# Patient Record
Sex: FEMALE
[De-identification: names, ages, dates, MRNs, and addresses within clinical notes are randomized per-mention and may not be internally consistent; named-entity substitution may affect disease eponyms.]

---

## 2017-04-19 ENCOUNTER — CHARTING TRANS (OUTPATIENT)
Dept: OTHER | Age: 32
End: 2017-04-19

## 2017-09-18 ENCOUNTER — CHARTING TRANS (OUTPATIENT)
Dept: OTHER | Age: 32
End: 2017-09-18

## 2017-09-21 ENCOUNTER — CHARTING TRANS (OUTPATIENT)
Dept: OTHER | Age: 32
End: 2017-09-21

## 2017-12-28 ENCOUNTER — HOSPITAL (OUTPATIENT)
Dept: OTHER | Age: 32
End: 2017-12-28
Attending: FAMILY MEDICINE

## 2018-11-03 VITALS
HEART RATE: 88 BPM | BODY MASS INDEX: 21.69 KG/M2 | TEMPERATURE: 98.6 F | RESPIRATION RATE: 18 BRPM | WEIGHT: 135 LBS | OXYGEN SATURATION: 98 % | HEIGHT: 66 IN

## 2022-06-23 ENCOUNTER — HOSPITAL ENCOUNTER (OUTPATIENT)
Age: 37
Discharge: HOME | End: 2022-06-23
Payer: COMMERCIAL

## 2022-06-23 VITALS
OXYGEN SATURATION: 99 % | RESPIRATION RATE: 20 BRPM | WEIGHT: 135 LBS | DIASTOLIC BLOOD PRESSURE: 87 MMHG | HEIGHT: 65 IN | SYSTOLIC BLOOD PRESSURE: 120 MMHG | TEMPERATURE: 98 F | BODY MASS INDEX: 22.49 KG/M2 | HEART RATE: 99 BPM

## 2022-06-23 DIAGNOSIS — H66.006 RECURRENT ACUTE SUPPURATIVE OTITIS MEDIA WITHOUT SPONTANEOUS RUPTURE OF TYMPANIC MEMBRANE OF BOTH SIDES: ICD-10-CM

## 2022-06-23 DIAGNOSIS — J01.41 ACUTE RECURRENT PANSINUSITIS: Primary | ICD-10-CM

## 2022-06-23 RX ORDER — PREDNISONE 20 MG/1
40 TABLET ORAL DAILY
Qty: 8 TABLET | Refills: 0 | Status: SHIPPED | OUTPATIENT
Start: 2022-06-23 | End: 2022-06-27

## 2022-06-23 RX ORDER — AMOXICILLIN AND CLAVULANATE POTASSIUM 875; 125 MG/1; MG/1
1 TABLET, FILM COATED ORAL 2 TIMES DAILY
Qty: 20 TABLET | Refills: 0 | Status: SHIPPED | OUTPATIENT
Start: 2022-06-23 | End: 2022-07-03

## 2022-06-23 RX ORDER — DEXAMETHASONE 4 MG/1
16 TABLET ORAL ONCE
Status: COMPLETED | OUTPATIENT
Start: 2022-06-23 | End: 2022-06-23

## 2022-09-22 ENCOUNTER — HOSPITAL ENCOUNTER (OUTPATIENT)
Age: 37
Discharge: HOME | End: 2022-09-22

## 2022-09-22 VITALS
SYSTOLIC BLOOD PRESSURE: 119 MMHG | RESPIRATION RATE: 20 BRPM | OXYGEN SATURATION: 100 % | HEART RATE: 104 BPM | DIASTOLIC BLOOD PRESSURE: 87 MMHG | TEMPERATURE: 98 F

## 2022-09-22 DIAGNOSIS — B97.89 VIRAL SINUSITIS: Primary | ICD-10-CM

## 2022-09-22 DIAGNOSIS — J32.9 VIRAL SINUSITIS: Primary | ICD-10-CM

## 2022-09-22 LAB — SARS-COV-2 RNA RESP QL NAA+PROBE: NOT DETECTED

## 2022-09-22 PROCEDURE — 99213 OFFICE O/P EST LOW 20 MIN: CPT | Performed by: NURSE PRACTITIONER

## 2022-09-22 PROCEDURE — U0002 COVID-19 LAB TEST NON-CDC: HCPCS | Performed by: NURSE PRACTITIONER

## 2022-09-22 RX ORDER — PREDNISONE 20 MG/1
20 TABLET ORAL 2 TIMES DAILY
Qty: 10 TABLET | Refills: 0 | Status: SHIPPED | OUTPATIENT
Start: 2022-09-22 | End: 2022-09-27

## 2022-09-22 RX ORDER — MOMETASONE FUROATE 50 UG/1
1 SPRAY, METERED NASAL DAILY
Qty: 1 EACH | Refills: 0 | Status: SHIPPED | OUTPATIENT
Start: 2022-09-22 | End: 2022-09-29

## 2022-09-22 NOTE — ED INITIAL ASSESSMENT (HPI)
Pt began 3-4 days ago with a sore throat and congestion, now it is in her sinuses and she has face and ear pressure

## 2022-09-29 ENCOUNTER — HOSPITAL ENCOUNTER (OUTPATIENT)
Age: 37
Discharge: HOME | End: 2022-09-29

## 2022-09-29 VITALS
TEMPERATURE: 98 F | RESPIRATION RATE: 18 BRPM | HEART RATE: 89 BPM | DIASTOLIC BLOOD PRESSURE: 90 MMHG | OXYGEN SATURATION: 97 % | SYSTOLIC BLOOD PRESSURE: 125 MMHG

## 2022-09-29 DIAGNOSIS — S61.213A LACERATION OF LEFT MIDDLE FINGER WITHOUT FOREIGN BODY WITHOUT DAMAGE TO NAIL, INITIAL ENCOUNTER: Primary | ICD-10-CM

## 2022-09-29 PROCEDURE — 12001 RPR S/N/AX/GEN/TRNK 2.5CM/<: CPT | Performed by: NURSE PRACTITIONER

## 2022-09-29 NOTE — ED INITIAL ASSESSMENT (HPI)
PT c/o finger lac to middle finger on left hand. Pt concerned it may need stitches.    In addition, Pt c/o continuous congestion since previous visit to IC 1x week ago

## 2024-06-22 ENCOUNTER — V-VISIT (OUTPATIENT)
Dept: URGENT CARE | Age: 39
End: 2024-06-22

## 2024-06-22 VITALS
TEMPERATURE: 98.9 F | BODY MASS INDEX: 24.99 KG/M2 | HEART RATE: 79 BPM | DIASTOLIC BLOOD PRESSURE: 90 MMHG | HEIGHT: 65 IN | SYSTOLIC BLOOD PRESSURE: 124 MMHG | WEIGHT: 150 LBS | OXYGEN SATURATION: 98 % | RESPIRATION RATE: 14 BRPM

## 2024-06-22 DIAGNOSIS — L30.9 DERMATITIS: ICD-10-CM
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
## 2024-06-22 DIAGNOSIS — S10.86XA INSECT BITE OF OTHER PART OF NECK, INITIAL ENCOUNTER: Primary | ICD-10-CM

## 2024-06-22 RX ORDER — CEPHALEXIN 500 MG/1
500 CAPSULE ORAL 2 TIMES DAILY
Qty: 14 CAPSULE | Refills: 0 | Status: SHIPPED | OUTPATIENT
Start: 2024-06-22 | End: 2024-06-29

## 2024-06-22 ASSESSMENT — ENCOUNTER SYMPTOMS
FEVER: 0
CHILLS: 0
HEADACHES: 0

## 2024-06-22 ASSESSMENT — PAIN SCALES - GENERAL: PAINLEVEL: 0

## 2024-06-25 ENCOUNTER — TELEPHONE (OUTPATIENT)
Dept: FAMILY MEDICINE | Age: 39
End: 2024-06-25

## 2024-09-10 ENCOUNTER — HOSPITAL ENCOUNTER (OUTPATIENT)
Age: 39
Discharge: HOME | End: 2024-09-10
Payer: COMMERCIAL

## 2024-09-10 VITALS
HEART RATE: 66 BPM | TEMPERATURE: 98 F | DIASTOLIC BLOOD PRESSURE: 89 MMHG | RESPIRATION RATE: 18 BRPM | SYSTOLIC BLOOD PRESSURE: 120 MMHG | OXYGEN SATURATION: 98 %

## 2024-09-10 DIAGNOSIS — J02.9 VIRAL PHARYNGITIS: Primary | ICD-10-CM

## 2024-09-10 LAB — S PYO AG THROAT QL: NEGATIVE

## 2024-09-10 PROCEDURE — 87880 STREP A ASSAY W/OPTIC: CPT | Performed by: PHYSICIAN ASSISTANT

## 2024-09-10 PROCEDURE — 99213 OFFICE O/P EST LOW 20 MIN: CPT | Performed by: PHYSICIAN ASSISTANT

## 2024-09-10 RX ORDER — DEXAMETHASONE 4 MG/1
8 TABLET ORAL ONCE
Status: COMPLETED | OUTPATIENT
Start: 2024-09-10 | End: 2024-09-10

## 2024-09-10 NOTE — ED PROVIDER NOTES
Patient Seen in: [facility]      History     Chief Complaint   Patient presents with    Sore Throat     Stated Complaint: sore throat x 7 days    Subjective:   The history is provided by the patient.       39-year-old female with no significant past medical history presents to the immediate care due to sore throat for the past 7 days.  No fevers trismus drooling or muffled voice.  Mild nasal congestion.  Minimal scant cough.  No chest pain shortness of breath.  No medications attempted at home.  No known sick contacts.    Objective:   History reviewed. No pertinent past medical history.           History reviewed. No pertinent surgical history.             Social History     Socioeconomic History    Marital status: Single   Tobacco Use    Smoking status: Never    Smokeless tobacco: Never   Vaping Use    Vaping status: Never Used   Substance and Sexual Activity    Alcohol use: Not Currently    Drug use: Never              Review of Systems   Constitutional: Negative.    HENT:  Positive for congestion and sore throat. Negative for trouble swallowing and voice change.    Respiratory: Negative.     Cardiovascular: Negative.    Gastrointestinal: Negative.        Positive for stated Chief Complaint: Sore Throat    Other systems are as noted in HPI.  Constitutional and vital signs reviewed.      All other systems reviewed and negative except as noted above.    Physical Exam     ED Triage Vitals [09/10/24 0909]   /89   Pulse 66   Resp 18   Temp 97.5 °F (36.4 °C)   Temp src Temporal   SpO2 98 %   O2 Device None (Room air)       Current Vitals:   Vital Signs  BP: 120/89  Pulse: 66  Resp: 18  Temp: 97.5 °F (36.4 °C)  Temp src: Temporal    Oxygen Therapy  SpO2: 98 %  O2 Device: None (Room air)            Physical Exam  Vitals and nursing note reviewed.   Constitutional:       General: She is not in acute distress.     Appearance: She is well-developed.   HENT:      Head: Normocephalic.      Right  Ear: Tympanic membrane and ear canal normal.      Left Ear: Tympanic membrane and ear canal normal.      Nose: No congestion.      Mouth/Throat:      Mouth: Mucous membranes are moist.      Pharynx: No pharyngeal swelling, oropharyngeal exudate, posterior oropharyngeal erythema or uvula swelling.      Tonsils: 0 on the right. 0 on the left.      Comments: Cobblestoning  Eyes:      Conjunctiva/sclera: Conjunctivae normal.      Pupils: Pupils are equal, round, and reactive to light.   Cardiovascular:      Rate and Rhythm: Normal rate and regular rhythm.   Pulmonary:      Effort: Pulmonary effort is normal.   Musculoskeletal:      Cervical back: Normal range of motion.   Lymphadenopathy:      Cervical: No cervical adenopathy.   Skin:     General: Skin is warm.   Neurological:      General: No focal deficit present.      Mental Status: She is alert and oriented to person, place, and time.   Psychiatric:         Mood and Affect: Mood normal.         Behavior: Behavior normal.               ED Course     Labs Reviewed   POCT RAPID STREP - Normal                      MDM   Ddx -viral pharyngitis, strep pharyngitis, peritonsillar abscess      On exam the patient is afebrile. nontoxic.  Vital signs are stable.  Posterior pharynx unremarkable.  no concern for peritonsillar abscess.  no cervical lymphadenopathy.  Rest exam unremarkable.  Rapid strep testing is negative, COVID testing not performed due to duration of symptoms and limited clinical relevance.  Clinical exam is consistent with viral pharyngitis.   Advised to start antihistamine, ibuprofen. 1 time dose of decadron given, Discussed at length with patient at home care strict return precautions and importance close follow-up.  All questions were answered and patient is comfortable with treatment plan and discharge home                                 Medical Decision Making  Problems Addressed:  Viral pharyngitis: acute illness or injury    Amount and/or Complexity of  Data Reviewed  Labs: ordered. Decision-making details documented in ED Course.    Risk  OTC drugs.  Prescription drug management.        Disposition and Plan     Clinical Impression:  1. Viral pharyngitis         Disposition:  Discharge  9/10/2024  9:36 am    Follow-up:  Zelalem Graves MD  [address] [address]  [phone]                Medications Prescribed:  Current Discharge Medication List

## 2024-09-10 NOTE — DISCHARGE INSTRUCTIONS
Increase fluids and rest  Continue to use ibuprofen or tylenol  Start an antihistamine as needed   Follow-up with primary care doctor  Return to ER symptoms worsen

## 2025-01-09 ENCOUNTER — HOSPITAL ENCOUNTER (OUTPATIENT)
Age: 40
Discharge: HOME | End: 2025-01-09
Payer: COMMERCIAL

## 2025-01-09 VITALS
HEART RATE: 75 BPM | RESPIRATION RATE: 18 BRPM | DIASTOLIC BLOOD PRESSURE: 84 MMHG | TEMPERATURE: 98 F | SYSTOLIC BLOOD PRESSURE: 122 MMHG | OXYGEN SATURATION: 99 %

## 2025-01-09 DIAGNOSIS — R05.1 ACUTE COUGH: ICD-10-CM

## 2025-01-09 DIAGNOSIS — J11.1 INFLUENZA: ICD-10-CM

## 2025-01-09 DIAGNOSIS — U07.1 COVID-19: Primary | ICD-10-CM

## 2025-01-09 LAB
POCT INFLUENZA A: POSITIVE
POCT INFLUENZA B: NEGATIVE
SARS-COV-2 RNA RESP QL NAA+PROBE: DETECTED

## 2025-01-09 RX ORDER — GUAIFENESIN 600 MG/1
1200 TABLET, EXTENDED RELEASE ORAL 2 TIMES DAILY
Qty: 20 TABLET | Refills: 0 | Status: SHIPPED | OUTPATIENT
Start: 2025-01-09 | End: 2025-01-14

## 2025-01-09 RX ORDER — BENZONATATE 100 MG/1
100 CAPSULE ORAL 3 TIMES DAILY PRN
Qty: 30 CAPSULE | Refills: 0 | Status: SHIPPED | OUTPATIENT
Start: 2025-01-09 | End: 2025-02-08

## 2025-01-09 NOTE — ED INITIAL ASSESSMENT (HPI)
Pt with c/o cough, congestion and fever that started on Saturday.  Pt states symptoms worse yesterday.  Resolving today.  Pt taking dayquil/nyquil

## 2025-01-09 NOTE — ED PROVIDER NOTES
Patient Seen in: [facility]      History     Chief Complaint   Patient presents with    Cough/URI     Stated Complaint: cough fever congestion    Subjective:   38 yo female presents with cough, nasal congestion, and fever that started 5 days ago. Fever (tmax 100) yesterday, none today thus far.   Pt states she flew to [place] to attend a wedding and flew home yesterday.   Kids at home with Influenza.   No hx of asthma, and denies use of vape or smoking history.  OTC's include Daquil/Nyquil.     Pt denies shortness of breath, wheezing, nausea, vomiting or diarrhea.     The history is provided by the patient.           Objective:     History reviewed. No pertinent past medical history.           History reviewed. No pertinent surgical history.             Social History     Socioeconomic History    Marital status:    Tobacco Use    Smoking status: Never    Smokeless tobacco: Never   Vaping Use    Vaping status: Never Used   Substance and Sexual Activity    Alcohol use: Not Currently    Drug use: Never   Social History Narrative    ** Merged History Encounter **          Social Drivers of Health      Received from [facility]              Review of Systems   Constitutional:  Positive for chills and fever.   HENT:  Positive for congestion. Negative for sore throat and trouble swallowing.    Respiratory:  Positive for cough. Negative for shortness of breath and wheezing.    Cardiovascular:  Negative for chest pain, palpitations and leg swelling.   Gastrointestinal:  Negative for diarrhea, nausea and vomiting.       Positive for stated complaint: cough fever congestion  Other systems are as noted in HPI.  Constitutional and vital signs reviewed.      All other systems reviewed and negative except as noted above.    Physical Exam     ED Triage Vitals [01/09/25 0904]   /84   Pulse 75   Resp 18   Temp 98.3 °F (36.8 °C)   Temp src Oral   SpO2 99 %   O2 Device None (Room air)        Current Vitals:   Vital Signs  BP: 122/84  Pulse: 75  Resp: 18  Temp: 98.3 °F (36.8 °C)  Temp src: Oral    Oxygen Therapy  SpO2: 99 %  O2 Device: None (Room air)        Physical Exam  Vitals and nursing note reviewed.   Constitutional:       General: She is not in acute distress.     Appearance: Normal appearance. She is not ill-appearing, toxic-appearing or diaphoretic.   HENT:      Head: Normocephalic.      Right Ear: Tympanic membrane normal.      Left Ear: Tympanic membrane normal.      Nose: Congestion and rhinorrhea present.      Mouth/Throat:      Mouth: Mucous membranes are moist.      Pharynx: Oropharynx is clear.   Eyes:      General: No scleral icterus.     Conjunctiva/sclera: Conjunctivae normal.   Cardiovascular:      Rate and Rhythm: Normal rate and regular rhythm.      Pulses: Normal pulses.   Pulmonary:      Effort: Pulmonary effort is normal. No respiratory distress.      Breath sounds: Normal breath sounds. No stridor. No wheezing, rhonchi or rales.   Musculoskeletal:      Cervical back: Normal range of motion and neck supple. No tenderness.   Lymphadenopathy:      Cervical: No cervical adenopathy.   Skin:     General: Skin is warm and dry.   Neurological:      Mental Status: She is alert.   Psychiatric:         Mood and Affect: Mood normal.             ED Course     Labs Reviewed   POCT FLU TEST - Abnormal; Notable for the following components:       Result Value    POCT INFLUENZA A Positive (*)     All other components within normal limits    Narrative:     This assay is a rapid molecular in vitro test utilizing nucleic acid amplification of influenza A and B viral RNA.   RAPID SARS-COV-2 BY PCR - Abnormal; Notable for the following components:    Rapid SARS-CoV-2 by PCR Detected (*)     All other components within normal limits          MDM     Medical Decision Making  40 yo female presents with cough, nasal congestion, and fever that started 5 days ago. Fever (tmax 100) yesterday, none  today thus far.   Diff dx include Flu, Covid, bronchitis.  On exam, pt is well appearing, normal vitals, lungs clear bilaterally.  Flu AND Covid testing are both positive.   Past time frame for anti-viral medications.   Supportive care discussed; Mucinex, Tessalon, rest, steam, push fluids. Isolation guidelines discussed.   Return precautions emphasized if symptoms worsen or change.   Pt agreeable to plan.     Amount and/or Complexity of Data Reviewed  External Data Reviewed: notes.  Labs: ordered.    Risk  OTC drugs.  Prescription drug management.        Disposition and Plan     Clinical Impression:  1. COVID-19    2. Acute cough    3. Influenza         Disposition:  Discharge  1/9/2025  9:27 am    Follow-up:  Zelalem Graves MD  [address] [address]  [phone]    Schedule an appointment as soon as possible for a visit in 5 days  If symptoms worsen          Medications Prescribed:  Discharge Medication List as of 1/9/2025  9:28 AM        START taking these medications    Details   benzonatate 100 MG Oral Cap Take 1 capsule (100 mg total) by mouth 3 (three) times daily as needed for cough., Normal, Disp-30 capsule, R-0      guaiFENesin ER (MUCINEX) 600 MG Oral Tablet 12 Hr Take 2 tablets (1,200 mg total) by mouth 2 (two) times daily for 5 days., Normal, Disp-20 tablet, R-0                 Supplementary Documentation:

## 2025-01-09 NOTE — DISCHARGE INSTRUCTIONS
- Tessalon 1 capusule every 8 hours as needed.  - Take plain mucinex - 1200mg twice a day with a big glass of water  - Nasal saline - either spray (\"Ocean\" brand) or Neto Med Sinus Rinse 3 times a day  Flonase: 2 sprays to each nostril in the AM.  - Rest and push fluids  - Hot lemon tea with honey  - Steam twice a day (either in shower or with cup of hot water and a towel over your head)     Please follow up with your primary care doctor in 3-5 days if not improving.